# Patient Record
Sex: MALE | Race: WHITE | NOT HISPANIC OR LATINO | Employment: FULL TIME | URBAN - METROPOLITAN AREA
[De-identification: names, ages, dates, MRNs, and addresses within clinical notes are randomized per-mention and may not be internally consistent; named-entity substitution may affect disease eponyms.]

---

## 2019-10-07 ENCOUNTER — OFFICE VISIT (OUTPATIENT)
Dept: URGENT CARE | Facility: URGENT CARE | Age: 54
End: 2019-10-07
Payer: COMMERCIAL

## 2019-10-07 VITALS
BODY MASS INDEX: 25.9 KG/M2 | DIASTOLIC BLOOD PRESSURE: 88 MMHG | TEMPERATURE: 98.2 F | HEART RATE: 109 BPM | SYSTOLIC BLOOD PRESSURE: 186 MMHG | RESPIRATION RATE: 18 BRPM | OXYGEN SATURATION: 98 % | WEIGHT: 191 LBS

## 2019-10-07 DIAGNOSIS — B02.9 HERPES ZOSTER WITHOUT COMPLICATION: Primary | ICD-10-CM

## 2019-10-07 PROCEDURE — 99203 OFFICE O/P NEW LOW 30 MIN: CPT

## 2019-10-07 RX ORDER — VALACYCLOVIR HYDROCHLORIDE 1 G/1
1000 TABLET, FILM COATED ORAL 3 TIMES DAILY
Qty: 21 TABLET | Refills: 0 | Status: SHIPPED | OUTPATIENT
Start: 2019-10-07

## 2019-10-07 ASSESSMENT — ENCOUNTER SYMPTOMS
ACTIVITY CHANGE: 0
FEVER: 0
FATIGUE: 0
APPETITE CHANGE: 0

## 2019-10-07 NOTE — PROGRESS NOTES
SUBJECTIVE:   Jv Mcduffie is a 53 year old male presenting with a chief complaint of   Chief Complaint   Patient presents with     Urgent Care     Derm Problem     Pt states that he notices a burn on his left side of head but had no trauma or injury that he can recall, states he  noticed it since last Wednesday now started blistering        He is a new patient of [facility].    Rash    Onset of rash was 1 week ago.   Course of illness is sudden onset and worsening.  Severity moderate  Current and Associated symptoms: itching, burning, painful, red and blistering   Location of the rash: Occiput to posterior head  Previous history of a similar rash? No  Recent exposure history: none known  Denies exposure to: none known  Associated symptoms include: nothing.  Treatment measures tried include: aloe vera, NSAID and tylenol with some relief    Review of Systems   Constitutional: Negative for activity change, appetite change, fatigue and fever.   Skin: Positive for rash.   All other systems reviewed and are negative.      History reviewed. No pertinent past medical history.  History reviewed. No pertinent family history.  Current Outpatient Medications   Medication Sig Dispense Refill     IBUPROFEN 200 MG PO CAPS 2 CAPSULE EVERY 4 TO 6 HOURS AS NEEDED       TYLENOL 325 MG PO TABS 2 TABLETS EVERY 4 HOURS AS NEEDED       valACYclovir (VALTREX) 1000 mg tablet Take 1 tablet (1,000 mg) by mouth 3 times daily 21 tablet 0     zolpidem (AMBIEN) 10 MG tablet Take 1 tablet by mouth. Take 1/2 hour prior to lights out (Patient not taking: Reported on 10/7/2019) 1 tablet 0     Social History     Tobacco Use     Smoking status: Never Smoker     Smokeless tobacco: Never Used   Substance Use Topics     Alcohol use: Yes     Comment: occ       OBJECTIVE  BP (!) 186/88   Pulse 109   Temp 98.2  F (36.8  C) (Oral)   Resp 18   Wt 86.6 kg (191 lb)   SpO2 98%   BMI 25.90 kg/m      Physical Exam  Constitutional:       Appearance: Normal  appearance. He is not ill-appearing.   Neck:      Musculoskeletal: Neck supple.   Cardiovascular:      Rate and Rhythm: Normal rate and regular rhythm.      Pulses: Normal pulses.      Heart sounds: Normal heart sounds. No murmur. No friction rub. No gallop.    Pulmonary:      Effort: Pulmonary effort is normal.      Breath sounds: Normal breath sounds. No wheezing.   Lymphadenopathy:      Cervical: No cervical adenopathy.   Skin:     General: Skin is warm.      Capillary Refill: Capillary refill takes less than 2 seconds.      Findings: Rash present.      Comments: Erythemic vesicular rash throughout occiput of scalp through nape of neck along dermatome C2 into C3, not involving any facial structures   Neurological:      General: No focal deficit present.      Mental Status: He is alert and oriented to person, place, and time. Mental status is at baseline.   Psychiatric:         Behavior: Behavior normal.         Labs:  No results found for this or any previous visit (from the past 24 hour(s)).      ASSESSMENT:    ICD-10-CM    1. Herpes zoster without complication B02.9 valACYclovir (VALTREX) 1000 mg tablet        Medical Decision Making:    Differential Diagnosis:  Rash: Zoster    Serious Comorbid Conditions:  Adult:  None    PLAN: Discussed with patient the treatment options and nature of herpes zoster. Advised lesions should be compressed/soaked with saline, topical antibiotic ointment to any infected lesions and monitor closely.   Aloe Vera cream may help minor local pain. Discussed use of antiviral agents within 2-3 days of symptoms.     Prescription for valacyclovir (Valtrex) provided, and advised how it may shorten the course of acute symptoms and reduce the incidence of later neuralgia.    Briefly discussed postherpetic neuralgia is explained; this may occur especially in the elderly despite every attempt at prevention. Patient advised to follow up with a PCP in 1-2 weeks. Patient agreed to the plan of care  with no further questions or concerns.     DONIS Nunez, CNP    Patient Instructions     Patient Education     Shingles  Shingles is a viral infection caused by the same virus that causes chicken pox. Anyone who has had chicken pox may get shingles later in life. The virus stays in the body, but remains asleep (dormant). Shingles often occurs in older persons or persons with lowered immunity. But it can affect anyone at any age.  Shingles starts as a tingling patch of skin on one side of the body. Small, painful blisters may then appear. The rash rarely spreads to other parts of the body.  Exposure to shingles can't cause shingles. However, it can cause chicken pox in anyone who has not had chicken pox or has not been vaccinated. The contagious period ends when all blisters have crusted over, generally 1 to 2 weeks after the illness starts.  After the blisters heal, the affected skin may be sensitive or painful for weeks or months, gradually resolving over time. But, sometimes this can last longer and be permanent (called postherpetic neuralgia.)  Shingles vaccines are available. Vaccination can help prevent shingles or make it less painful. It is generally recommended for adults older than 50, even if you've had singles in the past. Talk with your healthcare provider about when to get vaccinated and which vaccine is best for you.  Home care    Medicines may be prescribed to help relieve pain. Take these medicines as directed. Ask your healthcare provider or pharmacist before using over-the-counter medicines for helping treat pain and itching.    In certain cases, antiviral medicines may be prescribed to reduce pain, shorten the illness, and prevent neuralgia. Take these medicines as directed.    Compresses made from a solution of cool water mixed with cornstarch or baking soda may help relieve pain and itching.     Gently wash skin daily with soap and water to help prevent infection. Be certain to rinse  off all of the soap, which can be irritating.    Trim fingernails and try not to scratch. Scratching the sores may leave scars.    Stay home from work or school until all blisters have formed a crust and you are no longer contagious.  Follow-up care  Follow up with your healthcare provider, or as directed.  When to seek medical advice    Fever of 100.4 F (38 C) or higher, or as directed by your healthcare provider    Affected skin is on the face or neck and any of the following occur:  ? Headache  ? Eye pain  ? Changes in vision  ? Sores near the eye  ? Weakness of facial muscles    Blisters occurring on new areas of the body    Pain, redness, or swelling of a joint    Signs of skin infection: colored drainage from the sores, warmth, increasing redness, fever, or increasing pain  Date Last Reviewed: 4/1/2018 2000-2018 [employer]. [address] [place] [address], [place] [id number]. All rights reserved. This information is not intended as a substitute for professional medical care. Always follow your healthcare professional's instructions.

## 2019-10-07 NOTE — PATIENT INSTRUCTIONS
Patient Education     Shingles  Shingles is a viral infection caused by the same virus that causes chicken pox. Anyone who has had chicken pox may get shingles later in life. The virus stays in the body, but remains asleep (dormant). Shingles often occurs in older persons or persons with lowered immunity. But it can affect anyone at any age.  Shingles starts as a tingling patch of skin on one side of the body. Small, painful blisters may then appear. The rash rarely spreads to other parts of the body.  Exposure to shingles can't cause shingles. However, it can cause chicken pox in anyone who has not had chicken pox or has not been vaccinated. The contagious period ends when all blisters have crusted over, generally 1 to 2 weeks after the illness starts.  After the blisters heal, the affected skin may be sensitive or painful for weeks or months, gradually resolving over time. But, sometimes this can last longer and be permanent (called postherpetic neuralgia.)  Shingles vaccines are available. Vaccination can help prevent shingles or make it less painful. It is generally recommended for adults older than 50, even if you've had singles in the past. Talk with your healthcare provider about when to get vaccinated and which vaccine is best for you.  Home care    Medicines may be prescribed to help relieve pain. Take these medicines as directed. Ask your healthcare provider or pharmacist before using over-the-counter medicines for helping treat pain and itching.    In certain cases, antiviral medicines may be prescribed to reduce pain, shorten the illness, and prevent neuralgia. Take these medicines as directed.    Compresses made from a solution of cool water mixed with cornstarch or baking soda may help relieve pain and itching.     Gently wash skin daily with soap and water to help prevent infection. Be certain to rinse off all of the soap, which can be irritating.    Trim fingernails and try not to scratch.  Scratching the sores may leave scars.    Stay home from work or school until all blisters have formed a crust and you are no longer contagious.  Follow-up care  Follow up with your healthcare provider, or as directed.  When to seek medical advice    Fever of 100.4 F (38 C) or higher, or as directed by your healthcare provider    Affected skin is on the face or neck and any of the following occur:  ? Headache  ? Eye pain  ? Changes in vision  ? Sores near the eye  ? Weakness of facial muscles    Blisters occurring on new areas of the body    Pain, redness, or swelling of a joint    Signs of skin infection: colored drainage from the sores, warmth, increasing redness, fever, or increasing pain  Date Last Reviewed: 4/1/2018 2000-2018 [employer]. [address] [place] [address], [place] [address]. All rights reserved. This information is not intended as a substitute for professional medical care. Always follow your healthcare professional's instructions.

## 2019-10-22 ENCOUNTER — OFFICE VISIT (OUTPATIENT)
Dept: FAMILY MEDICINE | Facility: CLINIC | Age: 54
End: 2019-10-22
Payer: COMMERCIAL

## 2019-10-22 VITALS
OXYGEN SATURATION: 99 % | SYSTOLIC BLOOD PRESSURE: 143 MMHG | WEIGHT: 193.5 LBS | BODY MASS INDEX: 26.21 KG/M2 | HEIGHT: 72 IN | HEART RATE: 97 BPM | TEMPERATURE: 97.8 F | DIASTOLIC BLOOD PRESSURE: 90 MMHG

## 2019-10-22 DIAGNOSIS — Z23 NEED FOR PROPHYLACTIC VACCINATION AND INOCULATION AGAINST INFLUENZA: Primary | ICD-10-CM

## 2019-10-22 PROCEDURE — 99202 OFFICE O/P NEW SF 15 MIN: CPT | Mod: 25 | Performed by: INTERNAL MEDICINE

## 2019-10-22 PROCEDURE — 90471 IMMUNIZATION ADMIN: CPT | Performed by: INTERNAL MEDICINE

## 2019-10-22 PROCEDURE — 90682 RIV4 VACC RECOMBINANT DNA IM: CPT | Performed by: INTERNAL MEDICINE

## 2019-10-22 ASSESSMENT — MIFFLIN-ST. JEOR: SCORE: 1760.71

## 2019-10-22 NOTE — PROGRESS NOTES
Subjective     Jv Mcduffie is a 54 year old male who presents to clinic today for the following health issues:    HPI   Shingles follow up      History reviewed. No pertinent past medical history.    Review of Systems   Respiratory: Negative for shortness of breath.    Cardiovascular: Negative for chest pain, palpitations and leg swelling.   Gastrointestinal: Negative for nausea and vomiting.   Neurological: Negative for weakness, light-headedness, numbness and headaches.       BP (!) 143/90   Pulse 97   Temp 97.8  F (36.6  C) (Oral)   Ht 1.829 m (6')   Wt 87.8 kg (193 lb 8 oz)   SpO2 99%   BMI 26.24 kg/m      Physical Exam  Vitals signs and nursing note reviewed.   Constitutional:       General: He is not in acute distress.  Neck:      Thyroid: No thyromegaly.   Cardiovascular:      Rate and Rhythm: Normal rate and regular rhythm.      Heart sounds: Normal heart sounds.   Pulmonary:      Effort: Pulmonary effort is normal. No respiratory distress.      Breath sounds: Normal breath sounds.   Neurological:      Mental Status: He is alert and oriented to person, place, and time.      Coordination: Coordination normal.           ICD-10-CM    1. Need for prophylactic vaccination and inoculation against influenza Z23 INFLUENZA QUAD, RECOMBINANT, P-FREE (RIV4) (FLUBLOCK) [83212]     Vaccine Administration, Initial [52111]       Patient Instructions   May get the new shingles vaccine (Shingrix) at the pharmacy or at the clinic (if clinic administration is covered by insurance).

## 2019-10-22 NOTE — PATIENT INSTRUCTIONS
May get the new shingles vaccine (Shingrix) at the pharmacy or at the clinic (if clinic administration is covered by insurance).

## 2019-10-28 ASSESSMENT — ENCOUNTER SYMPTOMS
VOMITING: 0
WEAKNESS: 0
NUMBNESS: 0
HEADACHES: 0
PALPITATIONS: 0
NAUSEA: 0
SHORTNESS OF BREATH: 0
LIGHT-HEADEDNESS: 0

## 2020-03-01 ENCOUNTER — HEALTH MAINTENANCE LETTER (OUTPATIENT)
Age: 55
End: 2020-03-01

## 2020-12-14 ENCOUNTER — HEALTH MAINTENANCE LETTER (OUTPATIENT)
Age: 55
End: 2020-12-14

## 2021-04-17 ENCOUNTER — HEALTH MAINTENANCE LETTER (OUTPATIENT)
Age: 56
End: 2021-04-17

## 2021-04-20 ENCOUNTER — IMMUNIZATION (OUTPATIENT)
Dept: PEDIATRICS | Facility: CLINIC | Age: 56
End: 2021-04-20
Payer: COMMERCIAL

## 2021-04-20 PROCEDURE — 91300 PR COVID VAC PFIZER DIL RECON 30 MCG/0.3 ML IM: CPT

## 2021-04-20 PROCEDURE — 0001A PR COVID VAC PFIZER DIL RECON 30 MCG/0.3 ML IM: CPT

## 2021-05-11 ENCOUNTER — IMMUNIZATION (OUTPATIENT)
Dept: PEDIATRICS | Facility: CLINIC | Age: 56
End: 2021-05-11
Attending: INTERNAL MEDICINE
Payer: COMMERCIAL

## 2021-05-11 PROCEDURE — 0002A PR COVID VAC PFIZER DIL RECON 30 MCG/0.3 ML IM: CPT

## 2021-05-11 PROCEDURE — 91300 PR COVID VAC PFIZER DIL RECON 30 MCG/0.3 ML IM: CPT

## 2021-10-02 ENCOUNTER — HEALTH MAINTENANCE LETTER (OUTPATIENT)
Age: 56
End: 2021-10-02

## 2021-11-29 ENCOUNTER — IMMUNIZATION (OUTPATIENT)
Dept: NURSING | Facility: CLINIC | Age: 56
End: 2021-11-29
Payer: COMMERCIAL

## 2021-11-29 PROCEDURE — 91306 COVID-19,PF,MODERNA (18+ YRS BOOSTER .25ML): CPT

## 2021-11-29 PROCEDURE — 0064A COVID-19,PF,MODERNA (18+ YRS BOOSTER .25ML): CPT

## 2022-05-08 ENCOUNTER — HEALTH MAINTENANCE LETTER (OUTPATIENT)
Age: 57
End: 2022-05-08

## 2022-06-03 ENCOUNTER — IMMUNIZATION (OUTPATIENT)
Dept: NURSING | Facility: CLINIC | Age: 57
End: 2022-06-03
Payer: COMMERCIAL

## 2022-06-03 PROCEDURE — 0064A COVID-19,PF,MODERNA (18+ YRS BOOSTER .25ML): CPT

## 2022-06-03 PROCEDURE — 91306 COVID-19,PF,MODERNA (18+ YRS BOOSTER .25ML): CPT

## 2022-12-08 ENCOUNTER — IMMUNIZATION (OUTPATIENT)
Dept: NURSING | Facility: CLINIC | Age: 57
End: 2022-12-08
Payer: COMMERCIAL

## 2022-12-08 PROCEDURE — 91312 COVID-19 VACCINE BIVALENT BOOSTER 12+ (PFIZER): CPT

## 2022-12-08 PROCEDURE — 0124A COVID-19 VACCINE BIVALENT BOOSTER 12+ (PFIZER): CPT

## 2023-01-14 ENCOUNTER — HEALTH MAINTENANCE LETTER (OUTPATIENT)
Age: 58
End: 2023-01-14

## 2023-06-02 ENCOUNTER — HEALTH MAINTENANCE LETTER (OUTPATIENT)
Age: 58
End: 2023-06-02

## 2023-09-16 ENCOUNTER — OFFICE VISIT (OUTPATIENT)
Dept: URGENT CARE | Facility: URGENT CARE | Age: 58
End: 2023-09-16
Payer: COMMERCIAL

## 2023-09-16 VITALS
SYSTOLIC BLOOD PRESSURE: 151 MMHG | BODY MASS INDEX: 25.9 KG/M2 | OXYGEN SATURATION: 97 % | DIASTOLIC BLOOD PRESSURE: 90 MMHG | WEIGHT: 191 LBS | TEMPERATURE: 98.8 F | HEART RATE: 89 BPM

## 2023-09-16 DIAGNOSIS — S05.01XA ABRASION OF RIGHT CORNEA, INITIAL ENCOUNTER: Primary | ICD-10-CM

## 2023-09-16 PROCEDURE — 99203 OFFICE O/P NEW LOW 30 MIN: CPT

## 2023-09-16 RX ORDER — MOXIFLOXACIN 5 MG/ML
1 SOLUTION/ DROPS OPHTHALMIC 4 TIMES DAILY
Qty: 3 ML | Refills: 0 | Status: SHIPPED | OUTPATIENT
Start: 2023-09-16 | End: 2023-09-23

## 2023-09-16 NOTE — PROGRESS NOTES
Assessment & Plan     Abrasion of right cornea, initial encounter    - moxifloxacin (VIGAMOX) 0.5 % ophthalmic solution; Place 1 drop into the right eye 4 times daily for 7 days    Treat with Vigamox.  Cool compress to eye prn comfort.  Close Follow-up with OPHTHALMOLOGY if no change or new or worsening sx prn.    Karime Bliss MD   Urgent Care Provider  [facility] KAILASH Sutton is a 57 year old, presenting for the following health issues:  Urgent Care and Eye Problem (Right eye red, sensitive to light and throbbing since Friday morning.)      HPI     Increasing redness and sensitivity to light- RIGHT eye.  No itching, only clear drainage.  Was helping dryer at home be serviced on W and had multiple particles in the air around him on W.  Sx started on F AM.  Wears glasses- no contacts.  No vision changes.        Review of Systems   Constitutional, HEENT, cardiovascular, pulmonary, GI, , musculoskeletal, neuro, skin, endocrine and psych systems are negative, except as otherwise noted.      Objective    BP (!) 151/90   Pulse 89   Temp 98.8  F (37.1  C) (Tympanic)   Wt 86.6 kg (191 lb)   SpO2 97%   BMI 25.90 kg/m    Body mass index is 25.9 kg/m .  Physical Exam   GENERAL: healthy, alert and no distress  EYES: Eyes grossly normal to inspection, PERRL, and EOMI, RIGHT eye is reddened with clear drainage.  Conjunctivae and sclerae are injected.  LEFT eye is normal.  No vision changes.  PSYCH: mentation appears normal, affect normal/bright    RIGHT eye is fluorescein stained with + corneal abrasion just below iris at six oclock position

## 2023-11-13 ENCOUNTER — HOSPITAL ENCOUNTER (EMERGENCY)
Facility: CLINIC | Age: 58
Discharge: HOME | End: 2023-11-13
Attending: EMERGENCY MEDICINE | Admitting: EMERGENCY MEDICINE
Payer: COMMERCIAL

## 2023-11-13 VITALS
WEIGHT: 185 LBS | RESPIRATION RATE: 20 BRPM | SYSTOLIC BLOOD PRESSURE: 136 MMHG | TEMPERATURE: 100.5 F | OXYGEN SATURATION: 99 % | DIASTOLIC BLOOD PRESSURE: 90 MMHG | BODY MASS INDEX: 25.06 KG/M2 | HEIGHT: 72 IN | HEART RATE: 109 BPM

## 2023-11-13 DIAGNOSIS — L03.319 CELLULITIS AND ABSCESS OF TRUNK: ICD-10-CM

## 2023-11-13 DIAGNOSIS — R73.9 BLOOD GLUCOSE ELEVATED: ICD-10-CM

## 2023-11-13 DIAGNOSIS — L02.219 CELLULITIS AND ABSCESS OF TRUNK: ICD-10-CM

## 2023-11-13 LAB
ANION GAP SERPL CALCULATED.3IONS-SCNC: 16 MMOL/L (ref 7–15)
BASOPHILS # BLD AUTO: 0.1 10E3/UL (ref 0–0.2)
BASOPHILS NFR BLD AUTO: 0 %
BUN SERPL-MCNC: 12.7 MG/DL (ref 6–20)
CALCIUM SERPL-MCNC: 9.6 MG/DL (ref 8.6–10)
CHLORIDE SERPL-SCNC: 94 MMOL/L (ref 98–107)
CREAT SERPL-MCNC: 0.72 MG/DL (ref 0.67–1.17)
DEPRECATED HCO3 PLAS-SCNC: 22 MMOL/L (ref 22–29)
EGFRCR SERPLBLD CKD-EPI 2021: >90 ML/MIN/1.73M2
EOSINOPHIL # BLD AUTO: 0 10E3/UL (ref 0–0.7)
EOSINOPHIL NFR BLD AUTO: 0 %
ERYTHROCYTE [DISTWIDTH] IN BLOOD BY AUTOMATED COUNT: 11.5 % (ref 10–15)
GLUCOSE SERPL-MCNC: 340 MG/DL (ref 70–99)
HCO3 BLDV-SCNC: 23 MMOL/L (ref 21–28)
HCT VFR BLD AUTO: 43.5 % (ref 40–53)
HGB BLD-MCNC: 14.7 G/DL (ref 13.3–17.7)
IMM GRANULOCYTES # BLD: 0.2 10E3/UL
IMM GRANULOCYTES NFR BLD: 1 %
LACTATE BLD-SCNC: 1 MMOL/L
LYMPHOCYTES # BLD AUTO: 1.1 10E3/UL (ref 0.8–5.3)
LYMPHOCYTES NFR BLD AUTO: 6 %
MCH RBC QN AUTO: 30.1 PG (ref 26.5–33)
MCHC RBC AUTO-ENTMCNC: 33.8 G/DL (ref 31.5–36.5)
MCV RBC AUTO: 89 FL (ref 78–100)
MONOCYTES # BLD AUTO: 1.1 10E3/UL (ref 0–1.3)
MONOCYTES NFR BLD AUTO: 6 %
NEUTROPHILS # BLD AUTO: 14.6 10E3/UL (ref 1.6–8.3)
NEUTROPHILS NFR BLD AUTO: 87 %
NRBC # BLD AUTO: 0 10E3/UL
NRBC BLD AUTO-RTO: 0 /100
PCO2 BLDV: 40 MM HG (ref 40–50)
PH BLDV: 7.36 [PH] (ref 7.32–7.43)
PLATELET # BLD AUTO: 304 10E3/UL (ref 150–450)
PO2 BLDV: 29 MM HG (ref 25–47)
POTASSIUM SERPL-SCNC: 4.2 MMOL/L (ref 3.4–5.3)
RBC # BLD AUTO: 4.89 10E6/UL (ref 4.4–5.9)
SAO2 % BLDV: 52 % (ref 94–100)
SODIUM SERPL-SCNC: 132 MMOL/L (ref 135–145)
WBC # BLD AUTO: 17 10E3/UL (ref 4–11)

## 2023-11-13 PROCEDURE — 36415 COLL VENOUS BLD VENIPUNCTURE: CPT | Performed by: EMERGENCY MEDICINE

## 2023-11-13 PROCEDURE — 87040 BLOOD CULTURE FOR BACTERIA: CPT | Mod: 59 | Performed by: EMERGENCY MEDICINE

## 2023-11-13 PROCEDURE — 99284 EMERGENCY DEPT VISIT MOD MDM: CPT | Mod: 25

## 2023-11-13 PROCEDURE — 96361 HYDRATE IV INFUSION ADD-ON: CPT | Mod: XU

## 2023-11-13 PROCEDURE — 250N000011 HC RX IP 250 OP 636: Mod: JZ | Performed by: EMERGENCY MEDICINE

## 2023-11-13 PROCEDURE — 83605 ASSAY OF LACTIC ACID: CPT

## 2023-11-13 PROCEDURE — 96375 TX/PRO/DX INJ NEW DRUG ADDON: CPT

## 2023-11-13 PROCEDURE — 82803 BLOOD GASES ANY COMBINATION: CPT

## 2023-11-13 PROCEDURE — 85025 COMPLETE CBC W/AUTO DIFF WBC: CPT | Performed by: EMERGENCY MEDICINE

## 2023-11-13 PROCEDURE — 258N000003 HC RX IP 258 OP 636: Performed by: EMERGENCY MEDICINE

## 2023-11-13 PROCEDURE — 96365 THER/PROPH/DIAG IV INF INIT: CPT | Mod: XU

## 2023-11-13 PROCEDURE — 80048 BASIC METABOLIC PNL TOTAL CA: CPT | Performed by: EMERGENCY MEDICINE

## 2023-11-13 PROCEDURE — 10060 I&D ABSCESS SIMPLE/SINGLE: CPT

## 2023-11-13 RX ORDER — CEPHALEXIN 500 MG/1
500 CAPSULE ORAL 4 TIMES DAILY
Qty: 28 CAPSULE | Refills: 0 | Status: SHIPPED | OUTPATIENT
Start: 2023-11-13 | End: 2023-11-20

## 2023-11-13 RX ORDER — KETOROLAC TROMETHAMINE 15 MG/ML
15 INJECTION, SOLUTION INTRAMUSCULAR; INTRAVENOUS ONCE
Status: COMPLETED | OUTPATIENT
Start: 2023-11-13 | End: 2023-11-13

## 2023-11-13 RX ORDER — CEFTRIAXONE 2 G/1
2 INJECTION, POWDER, FOR SOLUTION INTRAMUSCULAR; INTRAVENOUS ONCE
Status: COMPLETED | OUTPATIENT
Start: 2023-11-13 | End: 2023-11-13

## 2023-11-13 RX ORDER — SULFAMETHOXAZOLE/TRIMETHOPRIM 800-160 MG
1 TABLET ORAL 2 TIMES DAILY
Qty: 14 TABLET | Refills: 0 | Status: SHIPPED | OUTPATIENT
Start: 2023-11-13 | End: 2023-11-20

## 2023-11-13 RX ADMIN — CEFTRIAXONE SODIUM 2 G: 2 INJECTION, POWDER, FOR SOLUTION INTRAMUSCULAR; INTRAVENOUS at 10:23

## 2023-11-13 RX ADMIN — SODIUM CHLORIDE 1000 ML: 9 INJECTION, SOLUTION INTRAVENOUS at 09:27

## 2023-11-13 RX ADMIN — KETOROLAC TROMETHAMINE 15 MG: 15 INJECTION, SOLUTION INTRAMUSCULAR; INTRAVENOUS at 09:27

## 2023-11-13 ASSESSMENT — ACTIVITIES OF DAILY LIVING (ADL)
ADLS_ACUITY_SCORE: 35
ADLS_ACUITY_SCORE: 35

## 2023-11-13 NOTE — ED TRIAGE NOTES
Patient noticed a large red area to left side of back roughly 1 week ago, tender to touch. Approx hand sized area.      Triage Assessment (Adult)       Row Name 11/13/23 0849 11/13/23 0821       Triage Assessment    Airway WDL WDL WDL       Respiratory WDL    Respiratory WDL WDL WDL       Skin Circulation/Temperature WDL    Skin Circulation/Temperature WDL X  large reddened area to left side of back. for 1 week X  states has sore on back       Cardiac WDL    Cardiac WDL X;rhythm WDL    Cardiac Rhythm ST --       Peripheral/Neurovascular WDL    Peripheral Neurovascular WDL WDL WDL       Cognitive/Neuro/Behavioral WDL    Cognitive/Neuro/Behavioral WDL WDL WDL

## 2023-11-13 NOTE — ED PROVIDER NOTES
History     Chief Complaint:  Cyst and Wound Check     The history is provided by the patient.      Jv Meneses is a 58 year old male with no past pertinent medical history who presents to the emergency department for a cyst and wound check. The patient states that on Tuesday, he noticed a large abscess with surrounding erythema on his left flank. He adds that this abscess is tender to palpation. He notes that this abscess has been causing a sleep disturbance for the past 2 weeks. He has been taking increasing doses of ibuprofen and acetaminophen. He adds that he is also experiencing a fever. Denies nausea, vomiting, diarrhea, or cold symptoms. Denies hx of diabetes.    Independent Historian:   None - Patient Only    Review of External Notes:   None    Medications:    Valacyclovir  Zolpidem    Past Medical History:    No past medical history on file.    Past Surgical History:    No past surgical history on file.     Physical Exam   Patient Vitals for the past 24 hrs:   BP Temp Temp src Pulse Resp SpO2 Height Weight   11/13/23 0912 -- -- -- -- -- 99 % -- --   11/13/23 0911 -- (!) 100.5  F (38.1  C) Oral -- -- -- -- --   11/13/23 0900 (!) 135/90 -- -- 115 -- 98 % -- --   11/13/23 0852 (!) 140/94 -- -- -- -- 99 % -- --   11/13/23 0849 (!) 186/100 -- -- 118 20 99 % 1.829 m (6') 83.9 kg (185 lb)      Physical Exam  Constitutional: Vital signs reviewed as above  General: Alert, pleasant  HEENT: Moist mucous membranes  Eyes: Pupils are equal, round, and reactive to light.   Neck: Normal range of motion  Cardiovascular: normal rate, Regular rhythm and normal heart sounds.  No MRG  Pulmonary/Chest: Effort normal and breath sounds normal. No respiratory distress. Patient has no wheezes. Patient has no rales.   Gastrointestinal: Soft. Positive bowel sounds. No MRG.  Musculoskeletal/Extremities: Full ROM.  Endo: No pitting edema  Neurological: Alert, no focal deficits.  Skin: Skin is warm and dry. Large 15 cm abscess  with surrounding erythema to the left flank, no drainage or discharge, tender to palpation.  Psychiatric: Pleasant    Emergency Department Course     Laboratory:  Labs Ordered and Resulted from Time of ED Arrival to Time of ED Departure   BASIC METABOLIC PANEL - Abnormal       Result Value    Sodium 132 (*)     Potassium 4.2      Chloride 94 (*)     Carbon Dioxide (CO2) 22      Anion Gap 16 (*)     Urea Nitrogen 12.7      Creatinine 0.72      GFR Estimate >90      Calcium 9.6      Glucose 340 (*)    CBC WITH PLATELETS AND DIFFERENTIAL - Abnormal    WBC Count 17.0 (*)     RBC Count 4.89      Hemoglobin 14.7      Hematocrit 43.5      MCV 89      MCH 30.1      MCHC 33.8      RDW 11.5      Platelet Count 304      % Neutrophils 87      % Lymphocytes 6      % Monocytes 6      % Eosinophils 0      % Basophils 0      % Immature Granulocytes 1      NRBCs per 100 WBC 0      Absolute Neutrophils 14.6 (*)     Absolute Lymphocytes 1.1      Absolute Monocytes 1.1      Absolute Eosinophils 0.0      Absolute Basophils 0.1      Absolute Immature Granulocytes 0.2      Absolute NRBCs 0.0     ISTAT GASES LACTATE VENOUS POCT - Abnormal    Lactic Acid POCT 1.0      Bicarbonate Venous POCT 23      O2 Sat, Venous POCT 52 (*)     pCO2 Venous POCT 40      pH Venous POCT 7.36      pO2 Venous POCT 29     BLOOD CULTURE   BLOOD CULTURE      Procedures     Incision and Drainage     Procedure: Incision and Drainage     Consent: Verbal    Indication: Abscess    Location: Left flank    Size: 15 cm    Ultrasound Guidance: No    Preparation: Povidone-iodine     Anesthesia/Sedation: Lidocaine - 1%     Procedure Detail:    Aspiration: No  Incision Type: Single straight  Scalpel: 11  Lesion Management: Probed and deloculated   Wound Management: Left open   Packing: None     Patient Status: The patient tolerated the procedure well: Yes. There were no complications.    Emergency Department Course & Assessments:    Interventions:  Medications   sodium  chloride 0.9% BOLUS 1,000 mL (0 mLs Intravenous Stopped 11/13/23 1023)   ketorolac (TORADOL) injection 15 mg (15 mg Intravenous $Given 11/13/23 0927)   cefTRIAXone (ROCEPHIN) 2 g vial to attach to  ml bag for ADULTS or NS 50 ml bag for PEDS (2 g Intravenous $New Bag 11/13/23 1023)      Assessments:  0911 I obtained history and examined the patient as noted above.   0944 I performed the incision and drainage as noted above in the procedure note.  1047 I discussed findings and discharge with the patient. All questions answered.     Independent Interpretation (X-rays, CTs, rhythm strip):  None    Consultations/Discussion of Management or Tests:  None     Social Determinants of Health affecting care:   None    Disposition:  The patient was discharged to home.     Impression & Plan      Medical Decision Making:  Patient presents with obvious abscess and dry cellulitis to the left flank.  Is been there for at least a couple of weeks.  Patient is not on any medications and is not a known diabetic.  His glucose level here did come back markedly elevated at 340.  He was febrile and tachycardic here as well.  For she is lactate was unremarkable.  He received some fluids and Toradol which brought his fever down and he is feeling much better.  The abscess was incised and drained.  Please see my procedure note as above.  He tolerated this well.  He was given a dose of Rocephin here.  I will start him on Keflex and Bactrim at home.  Reasons to return the emergency were discussed and he was discharged home.    Diagnosis:    ICD-10-CM    1. Cellulitis and abscess of trunk  L03.319     L02.219       2. Blood glucose elevated  R73.9            Discharge Medications:  New Prescriptions    CEPHALEXIN (KEFLEX) 500 MG CAPSULE    Take 1 capsule (500 mg) by mouth 4 times daily for 7 days    SULFAMETHOXAZOLE-TRIMETHOPRIM (BACTRIM DS) 800-160 MG TABLET    Take 1 tablet by mouth 2 times daily for 7 days      Johanna Disclosure:  Zachary OSEI  Carter, am serving as a scribe at 9:44 AM on 11/13/2023 to document services personally performed by Cory Marquez MD based on my observations and the provider's statements to me.     11/13/2023   Cory Marquez MD Walters, Brent Aaron, MD  11/13/23 1055

## 2023-11-18 LAB
BACTERIA BLD CULT: NO GROWTH
BACTERIA BLD CULT: NO GROWTH

## 2024-06-30 ENCOUNTER — HEALTH MAINTENANCE LETTER (OUTPATIENT)
Age: 59
End: 2024-06-30

## 2024-12-12 ENCOUNTER — TRANSFERRED RECORDS (OUTPATIENT)
Dept: MULTI SPECIALTY CLINIC | Facility: CLINIC | Age: 59
End: 2024-12-12

## 2024-12-12 LAB — RETINOPATHY: NORMAL

## 2025-02-07 ENCOUNTER — OFFICE VISIT (OUTPATIENT)
Dept: FAMILY MEDICINE | Facility: CLINIC | Age: 60
End: 2025-02-07
Payer: COMMERCIAL

## 2025-02-07 VITALS
TEMPERATURE: 98.4 F | HEIGHT: 72 IN | OXYGEN SATURATION: 98 % | SYSTOLIC BLOOD PRESSURE: 164 MMHG | HEART RATE: 82 BPM | RESPIRATION RATE: 16 BRPM | DIASTOLIC BLOOD PRESSURE: 100 MMHG | BODY MASS INDEX: 24.92 KG/M2 | WEIGHT: 184 LBS

## 2025-02-07 DIAGNOSIS — Z76.89 ESTABLISHING CARE WITH NEW DOCTOR, ENCOUNTER FOR: Primary | ICD-10-CM

## 2025-02-07 DIAGNOSIS — Z12.5 SCREENING FOR PROSTATE CANCER: ICD-10-CM

## 2025-02-07 DIAGNOSIS — Z83.3 FAMILY HISTORY OF DIABETES MELLITUS: ICD-10-CM

## 2025-02-07 DIAGNOSIS — E78.5 HYPERLIPIDEMIA LDL GOAL <100: ICD-10-CM

## 2025-02-07 DIAGNOSIS — E11.65 TYPE 2 DIABETES MELLITUS WITH HYPERGLYCEMIA, WITHOUT LONG-TERM CURRENT USE OF INSULIN (H): ICD-10-CM

## 2025-02-07 DIAGNOSIS — I10 BENIGN ESSENTIAL HYPERTENSION: ICD-10-CM

## 2025-02-07 DIAGNOSIS — Z12.11 SCREEN FOR COLON CANCER: ICD-10-CM

## 2025-02-07 LAB
CHOLEST SERPL-MCNC: 184 MG/DL
FASTING STATUS PATIENT QL REPORTED: YES
FASTING STATUS PATIENT QL REPORTED: YES
GLUCOSE SERPL-MCNC: 225 MG/DL (ref 70–99)
HDLC SERPL-MCNC: 44 MG/DL
LDLC SERPL CALC-MCNC: 120 MG/DL
NONHDLC SERPL-MCNC: 140 MG/DL
PSA SERPL DL<=0.01 NG/ML-MCNC: 0.78 NG/ML (ref 0–3.5)
TRIGL SERPL-MCNC: 99 MG/DL

## 2025-02-07 PROCEDURE — G2211 COMPLEX E/M VISIT ADD ON: HCPCS | Performed by: INTERNAL MEDICINE

## 2025-02-07 PROCEDURE — 99204 OFFICE O/P NEW MOD 45 MIN: CPT | Performed by: INTERNAL MEDICINE

## 2025-02-07 PROCEDURE — G0103 PSA SCREENING: HCPCS | Performed by: INTERNAL MEDICINE

## 2025-02-07 PROCEDURE — 82947 ASSAY GLUCOSE BLOOD QUANT: CPT | Performed by: INTERNAL MEDICINE

## 2025-02-07 PROCEDURE — 36415 COLL VENOUS BLD VENIPUNCTURE: CPT | Performed by: INTERNAL MEDICINE

## 2025-02-07 PROCEDURE — 80061 LIPID PANEL: CPT | Performed by: INTERNAL MEDICINE

## 2025-02-07 RX ORDER — AMLODIPINE BESYLATE 5 MG/1
5 TABLET ORAL DAILY
Qty: 90 TABLET | Refills: 3 | Status: SHIPPED | OUTPATIENT
Start: 2025-02-07

## 2025-02-07 NOTE — PROGRESS NOTES
Assessment & Plan     Screen for colon cancer  - Colonoscopy Screening  Referral    Benign essential hypertension  - BP is not at goal  - amLODIPine (NORVASC) 5 MG tablet  Dispense: 90 tablet; Refill: 3  - Med Therapy Management Referral    Type 2 diabetes mellitus with hyperglycemia, without long-term current use of insulin (H)  Family history of diabetes mellitus  - symptoms not well controlled  - REVIEW OF HEALTH MAINTENANCE PROTOCOL ORDERS  - Adult Endocrinology  Referral  - Med Therapy Management Referral  - Hemoglobin A1c  - Glucose  - Glucose  - metFORMIN (GLUCOPHAGE XR) 500 MG 24 hr tablet  Dispense: 90 tablet; Refill: 3    Screening for prostate cancer  - PSA, screen  - PSA, screen    Establishing care with new doctor, encounter for  Hyperlipidemia LDL goal <100  - symptoms not well controlled  - Lipid panel reflex to direct LDL Non-fasting  - Lipid panel reflex to direct LDL Non-fasting  - atorvastatin (LIPITOR) 10 MG tablet  Dispense: 90 tablet; Refill: 3                  FUTURE APPOINTMENTS:       - Follow-up visit in 3 months    Cy Sutton is a 59 year old, presenting for the following health issues:  Establish Care and History of Present Illness (DM,Hypertension)    History of Present Illness       Reason for visit:  Check for diabetes, high blood pressure  Symptom onset:  More than a month  Symptoms include:  Retinopathy, Macular Edema, high blood pressure  Symptom intensity:  Severe  Symptom progression:  Worsening  Had these symptoms before:  No   He is taking medications regularly.       Current Outpatient Medications   Medication Sig Dispense Refill    amLODIPine (NORVASC) 5 MG tablet Take 1 tablet (5 mg) by mouth daily. 90 tablet 3     No current facility-administered medications for this visit.     Past Medical History:   Diagnosis Date    HTN (hypertension)        Family History   Problem Relation Age of Onset    Diabetes Mother        Social History     Socioeconomic  History    Marital status: Single     Spouse name: Not on file    Number of children: Not on file    Years of education: Not on file    Highest education level: Not on file   Occupational History    Not on file   Tobacco Use    Smoking status: Never    Smokeless tobacco: Never   Vaping Use    Vaping status: Never Used   Substance and Sexual Activity    Alcohol use: Yes     Comment: occ    Drug use: Never    Sexual activity: Not Currently     Partners: Female   Other Topics Concern    Parent/sibling w/ CABG, MI or angioplasty before 65F 55M? Not Asked   Social History Narrative    Not on file     Social Drivers of Health     Financial Resource Strain: Low Risk  (2/5/2025)    Financial Resource Strain     Within the past 12 months, have you or your family members you live with been unable to get utilities (heat, electricity) when it was really needed?: No   Food Insecurity: Low Risk  (2/5/2025)    Food Insecurity     Within the past 12 months, did you worry that your food would run out before you got money to buy more?: No     Within the past 12 months, did the food you bought just not last and you didn t have money to get more?: No   Transportation Needs: Low Risk  (2/5/2025)    Transportation Needs     Within the past 12 months, has lack of transportation kept you from medical appointments, getting your medicines, non-medical meetings or appointments, work, or from getting things that you need?: No   Physical Activity: Not on file   Stress: Not on file   Social Connections: Not on file   Interpersonal Safety: Low Risk  (2/7/2025)    Interpersonal Safety     Do you feel physically and emotionally safe where you currently live?: Yes     Within the past 12 months, have you been hit, slapped, kicked or otherwise physically hurt by someone?: No     Within the past 12 months, have you been humiliated or emotionally abused in other ways by your partner or ex-partner?: No   Housing Stability: Low Risk  (2/5/2025)    Housing  Stability     Do you have housing? : Yes     Are you worried about losing your housing?: No         Review of Systems  Constitutional, HEENT, cardiovascular, pulmonary, GI, , musculoskeletal, neuro, skin, endocrine and psych systems are negative, except as otherwise noted.      Objective    BP (!) 164/100 (BP Location: Right arm, Patient Position: Sitting, Cuff Size: Adult Regular)   Pulse 82   Temp 98.4  F (36.9  C) (Oral)   Resp 16   Ht 1.829 m (6')   Wt 83.5 kg (184 lb)   SpO2 98%   BMI 24.95 kg/m    Body mass index is 24.95 kg/m .  Physical Exam   GENERAL: alert and no distress  EYES: Eyes grossly normal to inspection, conjunctivae and sclerae normal  HENT: ear canals and TM's normal, nose and mouth without ulcers or lesions  NECK: no asymmetry, masses, or scars  RESP: no cough present  CV: regular rate rhythm  ABDOMEN: nondistended  MS: no gross musculoskeletal defects noted, no edema  SKIN: no suspicious lesions or rashes  NEURO: mentation intact and speech normal  PSYCH: affect normal/bright    Labs reviewed in Epic  Results for orders placed or performed in visit on 02/07/25   Lipid panel reflex to direct LDL Non-fasting     Status: Abnormal   Result Value Ref Range    Cholesterol 184 <200 mg/dL    Triglycerides 99 <150 mg/dL    Direct Measure HDL 44 >=40 mg/dL    LDL Cholesterol Calculated 120 (H) <100 mg/dL    Non HDL Cholesterol 140 (H) <130 mg/dL    Patient Fasting > 8hrs? Yes     Narrative    Cholesterol  Desirable: < 200 mg/dL  Borderline High: 200 - 239 mg/dL  High: >= 240 mg/dL    Triglycerides  Normal: < 150 mg/dL  Borderline High: 150 - 199 mg/dL  High: 200-499 mg/dL  Very High: >= 500 mg/dL    Direct Measure HDL  Female: >= 50 mg/dL   Male: >= 40 mg/dL    LDL Cholesterol  Desirable: < 100 mg/dL  Above Desirable: 100 - 129 mg/dL   Borderline High: 130 - 159 mg/dL   High:  160 - 189 mg/dL   Very High: >= 190 mg/dL    Non HDL Cholesterol  Desirable: < 130 mg/dL  Above Desirable: 130 - 159  mg/dL  Borderline High: 160 - 189 mg/dL  High: 190 - 219 mg/dL  Very High: >= 220 mg/dL   PSA, screen     Status: Normal   Result Value Ref Range    Prostate Specific Antigen Screen 0.78 0.00 - 3.50 ng/mL    Narrative    This result is obtained using the Roche Elecsys total PSA method on the link e801 immunoassay analyzer, which is an ultrasensitive method. Results obtained with different assay methods or kits cannot be used interchangeably.  This test is intended for initial prostate cancer screening. PSA values exceeding the age-specific limits are suspicious for prostate disease, but additional testing, such as prostate biopsy, is needed to diagnose prostate pathology. The American Cancer Society recommends annual examination with digital rectal examination and serum PSA beginning at age 50 and for men with a life expectancy of at least 10 years after detection of prostate cancer. For men in high-risk groups, such as  Americans or men with a first-degree relative diagnosed at a younger age, testing should begin at a younger age. It is generally recommended that information be provided to patients about the benefits and limitations of testing and treatment so they can make informed decisions.   Glucose     Status: Abnormal   Result Value Ref Range    Glucose 225 (H) 70 - 99 mg/dL    Patient Fasting > 8hrs? Yes          The longitudinal plan of care for the diagnosis(es)/condition(s) as documented were addressed during this visit. Due to the added complexity in care, I will continue to support Herman in the subsequent management and with ongoing continuity of care.          Signed Electronically by: Sharmila Zamarripa MD

## 2025-02-08 ENCOUNTER — HEALTH MAINTENANCE LETTER (OUTPATIENT)
Age: 60
End: 2025-02-08

## 2025-02-11 RX ORDER — ATORVASTATIN CALCIUM 10 MG/1
10 TABLET, FILM COATED ORAL DAILY
Qty: 90 TABLET | Refills: 3 | Status: SHIPPED | OUTPATIENT
Start: 2025-02-11

## 2025-02-11 RX ORDER — METFORMIN HYDROCHLORIDE 500 MG/1
500 TABLET, EXTENDED RELEASE ORAL
Qty: 90 TABLET | Refills: 3 | Status: SHIPPED | OUTPATIENT
Start: 2025-02-11

## 2025-02-15 ENCOUNTER — LAB (OUTPATIENT)
Dept: LAB | Facility: CLINIC | Age: 60
End: 2025-02-15
Payer: COMMERCIAL

## 2025-02-15 DIAGNOSIS — I10 BENIGN ESSENTIAL HYPERTENSION: Primary | ICD-10-CM

## 2025-02-15 DIAGNOSIS — Z11.59 NEED FOR HEPATITIS C SCREENING TEST: ICD-10-CM

## 2025-02-15 DIAGNOSIS — E11.65 TYPE 2 DIABETES MELLITUS WITH HYPERGLYCEMIA, WITHOUT LONG-TERM CURRENT USE OF INSULIN (H): ICD-10-CM

## 2025-02-15 DIAGNOSIS — Z11.4 SCREENING FOR HIV (HUMAN IMMUNODEFICIENCY VIRUS): ICD-10-CM

## 2025-02-15 LAB
EST. AVERAGE GLUCOSE BLD GHB EST-MCNC: 235 MG/DL
HBA1C MFR BLD: 9.8 % (ref 0–5.6)

## 2025-02-15 PROCEDURE — 83036 HEMOGLOBIN GLYCOSYLATED A1C: CPT

## 2025-02-15 PROCEDURE — 36415 COLL VENOUS BLD VENIPUNCTURE: CPT

## 2025-02-18 ENCOUNTER — TELEPHONE (OUTPATIENT)
Dept: PHARMACY | Facility: CLINIC | Age: 60
End: 2025-02-18
Payer: COMMERCIAL

## 2025-02-18 NOTE — TELEPHONE ENCOUNTER
Patient left message on [facility] scheduling line [phone] yesterday. Patient would like a sooner appointment with Diane Villarreal PharmD.  I returned call today and left message for patient, I added patient to the wait list to hopefully get a sooner appointment. I left message that I added to wait list and can return call to get in with Quinten Mendez in March but unable to schedule sooner with Diane at this time.

## 2025-02-22 ENCOUNTER — LAB (OUTPATIENT)
Dept: LAB | Facility: CLINIC | Age: 60
End: 2025-02-22
Payer: COMMERCIAL

## 2025-02-22 DIAGNOSIS — I10 BENIGN ESSENTIAL HYPERTENSION: ICD-10-CM

## 2025-02-22 DIAGNOSIS — Z11.59 NEED FOR HEPATITIS C SCREENING TEST: ICD-10-CM

## 2025-02-22 DIAGNOSIS — Z11.4 SCREENING FOR HIV (HUMAN IMMUNODEFICIENCY VIRUS): ICD-10-CM

## 2025-02-22 LAB
ANION GAP SERPL CALCULATED.3IONS-SCNC: 11 MMOL/L (ref 7–15)
BUN SERPL-MCNC: 18.5 MG/DL (ref 6–20)
CALCIUM SERPL-MCNC: 9.5 MG/DL (ref 8.8–10.4)
CHLORIDE SERPL-SCNC: 104 MMOL/L (ref 98–107)
CREAT SERPL-MCNC: 0.84 MG/DL (ref 0.67–1.17)
CREAT UR-MCNC: 61.2 MG/DL
EGFRCR SERPLBLD CKD-EPI 2021: >90 ML/MIN/1.73M2
GLUCOSE SERPL-MCNC: 149 MG/DL (ref 70–99)
HCO3 SERPL-SCNC: 25 MMOL/L (ref 22–29)
HCV AB SERPL QL IA: NONREACTIVE
HIV 1+2 AB+HIV1 P24 AG SERPL QL IA: NONREACTIVE
MICROALBUMIN UR-MCNC: <12 MG/L
MICROALBUMIN/CREAT UR: NORMAL MG/G{CREAT}
POTASSIUM SERPL-SCNC: 4.1 MMOL/L (ref 3.4–5.3)
SODIUM SERPL-SCNC: 140 MMOL/L (ref 135–145)

## 2025-02-22 PROCEDURE — 86803 HEPATITIS C AB TEST: CPT

## 2025-02-22 PROCEDURE — 87389 HIV-1 AG W/HIV-1&-2 AB AG IA: CPT

## 2025-02-22 PROCEDURE — 80048 BASIC METABOLIC PNL TOTAL CA: CPT

## 2025-02-22 PROCEDURE — 36415 COLL VENOUS BLD VENIPUNCTURE: CPT

## 2025-03-19 ENCOUNTER — OFFICE VISIT (OUTPATIENT)
Dept: PHARMACY | Facility: CLINIC | Age: 60
End: 2025-03-19

## 2025-03-19 VITALS — WEIGHT: 179.6 LBS | SYSTOLIC BLOOD PRESSURE: 138 MMHG | BODY MASS INDEX: 24.36 KG/M2 | DIASTOLIC BLOOD PRESSURE: 66 MMHG

## 2025-03-19 DIAGNOSIS — E78.5 HYPERLIPIDEMIA LDL GOAL <70: ICD-10-CM

## 2025-03-19 DIAGNOSIS — I10 HYPERTENSION, UNSPECIFIED TYPE: ICD-10-CM

## 2025-03-19 DIAGNOSIS — E11.65 TYPE 2 DIABETES MELLITUS WITH HYPERGLYCEMIA, WITHOUT LONG-TERM CURRENT USE OF INSULIN (H): Primary | ICD-10-CM

## 2025-03-19 DIAGNOSIS — Z78.9 TAKES DIETARY SUPPLEMENTS: ICD-10-CM

## 2025-03-19 PROCEDURE — 3075F SYST BP GE 130 - 139MM HG: CPT

## 2025-03-19 PROCEDURE — 3078F DIAST BP <80 MM HG: CPT

## 2025-03-19 PROCEDURE — 99605 MTMS BY PHARM NP 15 MIN: CPT

## 2025-03-19 PROCEDURE — 99607 MTMS BY PHARM ADDL 15 MIN: CPT

## 2025-03-19 RX ORDER — ASPIRIN 81 MG/1
81 TABLET ORAL DAILY
COMMUNITY

## 2025-03-19 RX ORDER — MULTIPLE VITAMINS W/ MINERALS TAB 9MG-400MCG
1 TAB ORAL DAILY
COMMUNITY

## 2025-03-19 NOTE — PATIENT INSTRUCTIONS
"Recommendations from today's MTM visit:                                                    MTM (medication therapy management) is a service provided by a clinical pharmacist designed to help you get the most of out of your medicines.   Today we reviewed what your medicines are for, how to know if they are working, that your medicines are safe and how to make your medicine regimen as easy as possible.      I will reach out to Dr. Zamarripa about a diabetes education referral to discuss diet recommendations  Continue all of your medications how you're taking them  Start taking aspirin 81 mg daily form over the counter  I will check with Dr. Zamarripa about sending in a prescription for a Freestyle Evita 3+ continuous glucose monitor to see if your insurance will cover it  When you do start your atorvastatin, we will check your cholesterol about 6-8 weeks later  Consider getting your pneumonia vaccine (PCV20) and Shingrix from the pharmacy  For your A1c, this can be checked every 3 months and our goal is to get it under 7  For your cholesterol, our goal is to get the LDL under 70  For your blood pressure, we would like to see numbers under 130/80 - start checking at home a few times per week  If you get a continuous glucose monitor, we will want your time in range (between ) over 70%    Follow-up: Khadijah when we hear about the glucose monitor, office visit on 4/21 at 2:30PM    It was great speaking with you today.  I value your experience and would be very thankful for your time in providing feedback in our clinic survey. In the next few days, you may receive an email or text message from [employer] with a link to a survey related to your  clinical pharmacist.\"     To schedule another MTM appointment, please call the clinic directly or you may call the MTM scheduling line at [phone] or toll-free at [phone].     My Clinical Pharmacist's contact information:                                                  "     Please feel free to contact me with any questions or concerns you have.      Vanessa Ford, PharmD  Medication Therapy Management Pharmacy Resident  [facility]  [phone]    Diane Villarreal PharmD, [facility]  Medication Therapy Management Provider  [facility]  [phone]

## 2025-03-19 NOTE — PROGRESS NOTES
Medication Therapy Management (MTM) Encounter    ASSESSMENT:                            Medication Adherence/Access: No issues identified.    Diabetes   A1c not at goal <7% before starting metformin, UACR at goal <30 mg/g. Would benefit from being able to monitor blood sugars at home, CGM would be preferred if covered by insurance.   Would benefit from referral to diabetes education for more advice on diet recommendations.   Metformin is typically recommended to be increased to maximum dose as tolerated, but would be reasonable to wait until he is able to see some home glucose readings before changing.  Since patient is >50 years old, daily low-dose aspirin would be recommended.  Due for multiple vaccines, declines all except pneumonia and Shingles.    Hypertension   BP at goal <140/90 mmHg today, but not more stringent goal of <130/80 mmHg. Would recommend patient get a blood pressure cuff and start monitoring at home to get a full assessment of his blood pressure.     Hyperlipidemia   LDL not at goal <70 mg/dL. Reasonable for patient to wait until his personal life is more settled to add another medication that his family has a history of side effects with. Would recommend when patient does start to recheck cholesterol 6-8 weeks later and watching closely for side effects.     Supplements   Stable.    PLAN:                            Okay per Dr. Zamarripa to place a diabetes education referral to discuss diet recommendations  Continue all of your medications how you're taking them  Start taking aspirin 81 mg daily from over the counter  Okay per Dr. Zamarripa to send in a prescription for a Freestyle Evita 3+ continuous glucose monitor to see if your insurance will cover it  When you do start your atorvastatin, we will check your cholesterol about 6-8 weeks later  Consider getting your pneumonia vaccine (PCV20) and Shingrix from the pharmacy  For your A1c, this can be checked every 3 months and our goal is to get it under  7  For your cholesterol, our goal is to get the LDL under 70  For your blood pressure, we would like to see numbers under 130/80 - start checking at home a few times per week  If you get a continuous glucose monitor, we will want your time in range (between ) over 70%    Follow-up: Khadijah when we hear about the glucose monitor, office visit on 4/21 at 2:30PM    SUBJECTIVE/OBJECTIVE:                          Herman Meneses is a 59 year old male coming in for an initial visit. He was referred to me from Dr. Zamarripa.      Reason for visit: Comprehensive medication review, new diabetes diagnosis.    Allergies/ADRs: Reviewed in chart  Past Medical History: Reviewed in chart  Tobacco: He reports that he has never smoked. He has never used smokeless tobacco.  Alcohol: 5 glasses of wine per year  Caffeine: tea with Chinese food    Medication Adherence/Access: no issues reported.    Diabetes   Metformin 500 mg daily - has been on for about 1 month  Not taking a baby aspirin - was never discussed with him  Patient is not experiencing side effects.  Current diabetes symptoms: none  Diet/Exercise: golfs in the summer, very sedentary during other times of the year. Has lots of questions about how to change his diet and eating habits to align with his new diagnosis.  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     Blood sugar monitoring: none right now, is interested in a CGM  Would be okay using a SMBG monitor after tax season is done if CGM is not covered.  Eye exam in the last 12 months? Yes- Date of last eye exam: 12/12/24,  Location: [facility]  Foot exam: due    Hypertension   Amlodipine 5 mg daily - started on 2/9  Patient reports no current medication side effects  Patient does not self-monitor blood pressure.  Thinks that his blood pressure may be higher when he is at the doctor's office due to being nervous.     Hyperlipidemia   Atorvastatin 10 mg daily - has not started this  yet  His dad took a statin in the past, reports that he did not react well to this and had to be given another medication instead. Prefers to wait until after tax season to start this in case he also has negative side effects.     Supplements   Multivitamin daily  No reported issues at this time.        Today's Vitals: /66   Wt 179 lb 9.6 oz (81.5 kg)   BMI 24.36 kg/m    ----------------    I spent 60 minutes with this patient today. I offer these suggestions for consideration by Sharmila Zamarripa MD. A copy of the visit note was provided to the patient's provider(s).    A summary of these recommendations was given to the patient.    Vanessa Ford, [place]  Medication Therapy Management Pharmacy Resident  [facility] and [facility]    The patient was seen independently by Dr. Ford.  I have read the note and agree with the assessment and plan.  Diane Villarreal PharmD, [facility]  Medication Therapy Management Provider  [phone]         Medication Therapy Recommendations  Type 2 diabetes mellitus with hyperglycemia, without long-term current use of insulin (H)   1 Current Medication: metFORMIN (GLUCOPHAGE XR) 500 MG 24 hr tablet   Current Medication Sig: Take 1 tablet (500 mg) by mouth daily (with dinner).   Rationale: Medication requires monitoring - Needs additional monitoring   Recommendation: Self-Monitoring - FreeStyle Evita 3 Plus Sensor Misc   Status: Accepted per Provider   Identified Date: 3/19/2025 Completed Date: 3/20/2025         2 Rationale: Preventive therapy - Needs additional medication therapy - Indication   Recommendation: Start Medication - aspirin 81 MG Tbec   Status: Accepted - no CPA Needed   Identified Date: 3/19/2025 Completed Date: 3/19/2025         3 Rationale: Preventive therapy - Needs additional medication therapy - Indication   Recommendation: Order Vaccine   Status: Patient Agreed - Adherence/Education   Identified Date: 3/19/2025 Completed Date: 3/19/2025   Note: Shingles, PCV20.  Denied flu, COVID, HepB, and tetanus

## 2025-03-20 RX ORDER — KETOROLAC TROMETHAMINE 30 MG/ML
1 INJECTION, SOLUTION INTRAMUSCULAR; INTRAVENOUS ONCE
Qty: 1 EACH | Refills: 0 | Status: SHIPPED | OUTPATIENT
Start: 2025-03-20 | End: 2025-03-20

## 2025-03-20 RX ORDER — HYDROCHLOROTHIAZIDE 12.5 MG/1
CAPSULE ORAL
Qty: 6 EACH | Refills: 3 | Status: SHIPPED | OUTPATIENT
Start: 2025-03-20

## 2025-03-20 NOTE — PROGRESS NOTES
Sharmila Zamarripa MD [place], Amy [facility]  Agree with plan    Thanks          Previous Messages       ----- Message -----  From: Vanessa Ford RPH  Sent: 3/20/2025   3:16 PM CDT  To: Sharmila Zamarripa MD  Subject: Diabetes Recommendations                        Hi Dr. Zamarripa,    I met with Herman earlier this week for an MTM visit and had a couple thoughts that I wanted to run by you. He had a lot of questions about what to eat and a good diet for his new diabetes diagnosis and I thought he could benefit from meeting with a diabetes educator so I would be happy to place that referral with your okay. He also requested to start a continuous glucose monitor which I am not sure we could get insurance coverage for, but I would be happy to place an order for the Freestyle Evita 3+ with your okay to see what his insurance says.    Thanks,  Vanessa Ford, PharmD  Medication Therapy Management Pharmacy Resident  [facility] and [facility]

## 2025-04-21 ENCOUNTER — OFFICE VISIT (OUTPATIENT)
Dept: PHARMACY | Facility: CLINIC | Age: 60
End: 2025-04-21
Attending: INTERNAL MEDICINE
Payer: COMMERCIAL

## 2025-04-21 VITALS — WEIGHT: 178 LBS | SYSTOLIC BLOOD PRESSURE: 154 MMHG | DIASTOLIC BLOOD PRESSURE: 82 MMHG | BODY MASS INDEX: 24.14 KG/M2

## 2025-04-21 DIAGNOSIS — E11.65 TYPE 2 DIABETES MELLITUS WITH HYPERGLYCEMIA, WITHOUT LONG-TERM CURRENT USE OF INSULIN (H): Primary | ICD-10-CM

## 2025-04-21 DIAGNOSIS — I10 HYPERTENSION, UNSPECIFIED TYPE: ICD-10-CM

## 2025-04-21 DIAGNOSIS — E78.5 HYPERLIPIDEMIA LDL GOAL <70: ICD-10-CM

## 2025-04-21 DIAGNOSIS — Z78.9 TAKES DIETARY SUPPLEMENTS: ICD-10-CM

## 2025-04-21 PROCEDURE — 3079F DIAST BP 80-89 MM HG: CPT | Performed by: PHARMACIST

## 2025-04-21 PROCEDURE — 99207 PR NO CHARGE LOS: CPT | Performed by: PHARMACIST

## 2025-04-21 PROCEDURE — 3077F SYST BP >= 140 MM HG: CPT | Performed by: PHARMACIST

## 2025-04-21 RX ORDER — METFORMIN HYDROCHLORIDE 500 MG/1
1000 TABLET, EXTENDED RELEASE ORAL
Qty: 180 TABLET | Refills: 3 | Status: SHIPPED | OUTPATIENT
Start: 2025-04-21

## 2025-04-21 NOTE — PROGRESS NOTES
Medication Therapy Management (MTM) Encounter    ASSESSMENT:                            Medication Adherence/Access: No issues identified.    Diabetes   Patient is not meeting A1c goal of < 7%.  Blood sugar is not currently being monitored.   May benefit from increasing metformin and adding aspirin.  It sounds like continuous glucose monitor may be covered - he'd like to check with DME suppliers before Rx is re-sent anywhere.  Due to re-check A1c next month.  He can schedule with diabetes education and/or endocrinology if desired.    Hypertension   Patient is not meeting blood pressure goal of < 130/80mmHg.  Agree that home blood pressure monitoring may be helpful, particularly since he's feeling nervous in clinic.    Hyperlipidemia   Plan in place to start statin therapy.      Supplements  Stable.     PLAN:                            This week - increase metformin XR to take 2 tablets per day.  If tolerated, then start atorvastatin daily  If tolerated, then add aspirin 81mg daily.  Plan to re-check A1c on Thursday, May 15th at 2pm.  Call the DME suppliers and let us know which one you'd like us to send the Evita 3+ prescription to.  I do think it'd be good to monitor your blood pressure at home - either with the home blood pressure monitor from work or just buying one over the counter.  If you'd like to move forward with scheduling diabetes education - you can call [phone].  If you'd like to schedule with endocrinology - you can call [phone].    Follow-up: Return in about 24 days (around 5/15/2025) for Lab Work.    SUBJECTIVE/OBJECTIVE:                          Herman Meneses is a 59 year old male seen for a follow-up visit.       Reason for visit: Diabetes follow-up.    Tobacco: He reports that he has never smoked. He has never used smokeless tobacco.  Alcohol: Less than 1 beverage / month    Medication Adherence/Access: no issues reported.    Diabetes   Metformin  mg daily - started  "2/15  Aspirin 81mg daily - recommended at last visit but hasn't started yet, was concerned about potential for GI upset while busy with tax season  Patient is not experiencing side effects.  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  He generally east one main meal per day, has been keeping food log for the last few days.  Currently eating 1-2 snacks per day, tends to snack more outside of the tax season.  Finds eating one main meal per day help him manage GI symptoms (needing to have a bowel movement after eating)  Plans to start golfing soon.   Blood sugar monitoring: never - continuous glucose monitor not covered by insurance.  He did call his insurance and was told it would be covered under his medical benefits (through preferred DME supplier [employer] [phone] and [employer] [phone]).  He also has a voucher for Evita 3+ for $75/month for 2 sensors.  Has access to [employer] blood sugar ($35 with unlimited strips, pays for lancets) and blood pressure monitor ($70) through work.  Insurance prefers [employer] testing supplies.  Eye exam is up to date  Foot exam: due  He wonders if he should schedule with diabetes education and/or endocrinology (has had referrals placed for both)    Hypertension   Amlodipine 5 mg daily - started on 2/9  Patient reports no current medication side effects  Patient does not self-monitor blood pressure.    Notes he's feeling \"revved up\" today and feels blood pressure will be high.     Hyperlipidemia   Atorvastatin 10 mg daily - has not started this yet  His dad took a statin in the past, reports that he did not react well to this and had to be given another medication instead.   Agrees to start now that tax season is over.     Supplements   Multivitamin daily  No reported issues at this time.      Today's Vitals: BP (!) 154/82   Wt 178 lb (80.7 kg)   BMI 24.14 kg/m  "   ----------------    I spent 45 minutes with this patient today. All changes were made via collaborative practice agreement with Dr. Zamarripa.     A summary of these recommendations was given to the patient.    Diane Villarreal, PharmD, [facility]  Medication Therapy Management Provider  [phone]      Medication Therapy Recommendations  Hyperlipidemia LDL goal <70   1 Rationale: Untreated condition - Needs additional medication therapy - Indication   Recommendation: Start Medication   Status: Patient Agreed - Adherence/Education   Identified Date: 4/21/2025 Completed Date: 4/21/2025         Type 2 diabetes mellitus with hyperglycemia, without long-term current use of insulin (H)   1 Current Medication: metFORMIN (GLUCOPHAGE XR) 500 MG 24 hr tablet (Discontinued)   Current Medication Sig: Take 1 tablet (500 mg) by mouth daily (with dinner).   Rationale: Dose too low - Dosage too low - Effectiveness   Recommendation: Increase Dose - metFORMIN 500 MG 24 hr tablet   Status: Accepted per CPA   Identified Date: 4/21/2025 Completed Date: 4/21/2025         2 Rationale: Preventive therapy - Needs additional medication therapy - Indication   Recommendation: Start Medication - ASPIRIN 81 PO   Status: Patient Agreed - Adherence/Education   Identified Date: 4/21/2025 Completed Date: 4/21/2025

## 2025-04-21 NOTE — PATIENT INSTRUCTIONS
"Recommendations from today's MTM visit:                                                    MTM (medication therapy management) is a service provided by a clinical pharmacist designed to help you get the most of out of your medicines.   Today we reviewed what your medicines are for, how to know if they are working, that your medicines are safe and how to make your medicine regimen as easy as possible.      This week - increase metformin XR to take 2 tablets per day.    If tolerated, then start atorvastatin daily    If tolerated, then add aspirin 81mg daily.    Plan to re-check A1c on Thursday, May 15th at 2pm.    Call the DME suppliers and let us know which one you'd like us to send the Evita 3+ prescription to.    I do think it'd be good to monitor your blood pressure at home - either with the home blood pressure monitor from work or just buying one over the counter.    If you'd like to move forward with scheduling diabetes education - you can call [phone].    If you'd like to schedule with endocrinology - you can call [phone].    Follow-up: Return in about 24 days (around 5/15/2025) for Lab Work.  We will touch base via [employer] after those labs    It was great speaking with you today.  I value your experience and would be very thankful for your time in providing feedback in our clinic survey. In the next few days, you may receive an email or text message from [employer] with a link to a survey related to your  clinical pharmacist.\"     To schedule another MTM appointment, please call the clinic directly or you may call the MTM scheduling line at [phone] or toll-free at [phone].     My Clinical Pharmacist's contact information:                                                      Please feel free to contact me with any questions or concerns you have.      Vanessa Ford, PharmD  Medication Therapy Management Pharmacy Resident  [facility]  [phone]    Diane " Phil, PharmD, [facility]  Medication Therapy Management Provider  [facility]  [phone]

## 2025-04-22 ENCOUNTER — MYC MEDICAL ADVICE (OUTPATIENT)
Dept: PHARMACY | Facility: CLINIC | Age: 60
End: 2025-04-22
Payer: COMMERCIAL

## 2025-04-22 DIAGNOSIS — E11.65 TYPE 2 DIABETES MELLITUS WITH HYPERGLYCEMIA, WITHOUT LONG-TERM CURRENT USE OF INSULIN (H): Primary | ICD-10-CM

## 2025-04-23 RX ORDER — HYDROCHLOROTHIAZIDE 12.5 MG/1
CAPSULE ORAL
Qty: 6 EACH | Refills: 1 | Status: SHIPPED | OUTPATIENT
Start: 2025-04-23

## 2025-04-25 ENCOUNTER — TRANSFERRED RECORDS (OUTPATIENT)
Dept: HEALTH INFORMATION MANAGEMENT | Facility: CLINIC | Age: 60
End: 2025-04-25
Payer: COMMERCIAL

## 2025-04-25 LAB — RETINOPATHY: POSITIVE

## 2025-04-28 ENCOUNTER — TELEPHONE (OUTPATIENT)
Dept: GASTROENTEROLOGY | Facility: CLINIC | Age: 60
End: 2025-04-28
Payer: COMMERCIAL

## 2025-04-28 DIAGNOSIS — Z12.11 ENCOUNTER FOR SCREENING COLONOSCOPY: Primary | ICD-10-CM

## 2025-04-28 RX ORDER — BISACODYL 5 MG/1
TABLET, DELAYED RELEASE ORAL
Qty: 4 TABLET | Refills: 0 | Status: SHIPPED | OUTPATIENT
Start: 2025-04-28

## 2025-04-28 NOTE — TELEPHONE ENCOUNTER
Pre visit planning completed.      Procedure details:    Patient scheduled for Colonoscopy on 5/13/25.     Arrival time: 1345. Procedure time 1430    Facility location: [facility]; [facility] [place] [address]. Check in location: 1st Floor [facility].     Sedation type: Conscious sedation     Pre op exam needed? No.    Indication for procedure: screening      Chart review:     Electronic implanted devices? No    Recent diagnosis of diverticulitis within the last 6 weeks? No      Medication review:    Diabetic? Yes. Oral diabetic medications: Metformin (glucophage): HOLD day of procedure.    Anticoagulants? No    Weight loss medication/injectable? No GLP-1 medication per patient's medication list. Nursing to verify with pre-assessment call.    Other medication HOLDING recommendations:  N/A      Prep for procedure:     Bowel prep recommendation: Standard Golytely. Bowel prep sent to    [id number], [place] - [address] AT [facility] [address]    Due to: diabetes    Procedure information and instructions sent via [employer]         Deepa Leonard RN  Endoscopy Procedure Pre Assessment   [phone] option 3

## 2025-04-28 NOTE — TELEPHONE ENCOUNTER
Pre assessment completed for upcoming procedure.   (Please see previous telephone encounter notes for complete details)      Procedure details:    Arrival time and facility location reviewed.    Pre op exam needed? No.    Designated  policy reviewed. Instructed to have someone stay 6 hours post procedure.       Medication review:    Medications reviewed. Please see supporting documentation below. Holding recommendations discussed (if applicable).   Oral diabetic medication(s): Metformin (glucophage): HOLD day of procedure.      Prep for procedure:    Procedure prep instructions reviewed.        Any additional information needed:  N/A      Patient verbalized understanding and had no questions or concerns at this time.      Selena Sorensen RN  Endoscopy Procedure Pre Assessment   [phone] option 3

## 2025-05-13 ENCOUNTER — HOSPITAL ENCOUNTER (OUTPATIENT)
Facility: CLINIC | Age: 60
Discharge: HOME | End: 2025-05-13
Attending: COLON & RECTAL SURGERY | Admitting: COLON & RECTAL SURGERY
Payer: COMMERCIAL

## 2025-05-13 VITALS
RESPIRATION RATE: 21 BRPM | HEART RATE: 60 BPM | OXYGEN SATURATION: 99 % | SYSTOLIC BLOOD PRESSURE: 109 MMHG | DIASTOLIC BLOOD PRESSURE: 52 MMHG

## 2025-05-13 LAB — COLONOSCOPY: NORMAL

## 2025-05-13 PROCEDURE — 99153 MOD SED SAME PHYS/QHP EA: CPT | Performed by: COLON & RECTAL SURGERY

## 2025-05-13 PROCEDURE — G0500 MOD SEDAT ENDO SERVICE >5YRS: HCPCS | Mod: PT | Performed by: COLON & RECTAL SURGERY

## 2025-05-13 PROCEDURE — 88305 TISSUE EXAM BY PATHOLOGIST: CPT | Mod: TC | Performed by: COLON & RECTAL SURGERY

## 2025-05-13 PROCEDURE — 250N000011 HC RX IP 250 OP 636: Performed by: COLON & RECTAL SURGERY

## 2025-05-13 PROCEDURE — 45385 COLONOSCOPY W/LESION REMOVAL: CPT | Mod: PT | Performed by: COLON & RECTAL SURGERY

## 2025-05-13 RX ORDER — FENTANYL CITRATE 50 UG/ML
INJECTION, SOLUTION INTRAMUSCULAR; INTRAVENOUS PRN
Status: DISCONTINUED | OUTPATIENT
Start: 2025-05-13 | End: 2025-05-13 | Stop reason: HOSPADM

## 2025-05-13 RX ORDER — ONDANSETRON 2 MG/ML
4 INJECTION INTRAMUSCULAR; INTRAVENOUS
Status: DISCONTINUED | OUTPATIENT
Start: 2025-05-13 | End: 2025-05-13 | Stop reason: HOSPADM

## 2025-05-13 RX ORDER — LIDOCAINE 40 MG/G
CREAM TOPICAL
Status: DISCONTINUED | OUTPATIENT
Start: 2025-05-13 | End: 2025-05-13 | Stop reason: HOSPADM

## 2025-05-13 ASSESSMENT — ACTIVITIES OF DAILY LIVING (ADL)
ADLS_ACUITY_SCORE: 41

## 2025-05-13 NOTE — H&P
Colon & Rectal Surgery History and Physical  Pre-Endoscopy Procedure Note    History of Present Illness   I have been asked by Dr. Zamarripa to evaluate this 59 year old male for colorectal cancer screening. He currently denies any abdominal pain, weight loss, bleeding per rectum, or recent change in bowel habits.    Past Medical History  Diagnosis Date    Diabetes     HTN (hypertension)        Past Surgical History   History reviewed. No pertinent surgical history.     Medications     Medications Prior to Admission   Medication Sig    amLODIPine (NORVASC) 5 MG tablet Take 1 tablet (5 mg) by mouth daily.    metFORMIN (GLUCOPHAGE XR) 500 MG 24 hr tablet Take 2 tablets (1,000 mg) by mouth daily (with dinner).    multivitamin w/minerals (THERA-VIT-M) tablet Take 1 tablet by mouth daily.    aspirin 81 MG EC tablet Take 81 mg by mouth daily.    atorvastatin (LIPITOR) 10 MG tablet Take 1 tablet (10 mg) by mouth daily.    losartan (COZAAR) 25 MG tablet Take 1 tablet (25 mg) by mouth daily.       Allergies   No Known Allergies     Family History   Family history includes Diabetes in his mother.     Social History   He reports that he has never smoked. He has never used smokeless tobacco. He reports current alcohol use. He reports that he does not use drugs.    Review of Systems   Constitutional:  No fever, weight change or fatigue.    Eyes:     No dry eyes or vision changes.   Ears/Nose/Throat/Neck:  No oral ulcers, sore throat or voice change.    Cardiovascular:   No palpitations, syncope, angina or edema.   Respiratory:    No chest pain, excessive sleepiness, shortness of breath or hemoptysis.    Gastrointestinal:   No abdominal pain, nausea, vomiting, diarrhea or heartburn.    Genitourinary:   No dysuria, hematuria, urinary retention or urinary frequency.   Musculoskeletal:  No joint swelling or arthralgias.    Dermatologic:  No skin rash or other skin changes.   Neurologic:    No focal weakness or numbness. No  neuropathy.   Psychiatric:    No depression, anxiety, suicidal ideation, or paranoid ideation.   Endocrine:   No cold or heat intolerance, polydipsia, hirsutism, change in libido, or flushing.   Hematology/Lymphatic:  No bleeding or lymphadenopathy.    Allergy/Immunology:  No rhinitis or hives.     Physical Exam   Vitals:  Pulse 93, resp. rate 14, SpO2 100%.    General:  Alert and oriented to person, place and time   Airway: Normal oropharyngeal airway and neck mobility   Lungs:  Clear bilaterally   Heart:  Regular rate and rhythm   Abdomen: Soft, NT, ND, no masses   Extremities: Warm, good capillary refill    ASA Grade: II (mild systemic disease)    Impression: Cleared for use of conscious sedation for colorectal cancer screening    Plan: Proceed with colonoscopy     Rachael Shepard MD  [facility]  [phone]

## 2025-05-15 LAB
PATH REPORT.COMMENTS IMP SPEC: NORMAL
PATH REPORT.COMMENTS IMP SPEC: NORMAL
PATH REPORT.FINAL DX SPEC: NORMAL
PATH REPORT.GROSS SPEC: NORMAL
PATH REPORT.MICROSCOPIC SPEC OTHER STN: NORMAL
PATH REPORT.RELEVANT HX SPEC: NORMAL
PHOTO IMAGE: NORMAL

## 2025-05-15 PROCEDURE — 88305 TISSUE EXAM BY PATHOLOGIST: CPT | Mod: 26 | Performed by: PATHOLOGY

## 2025-05-26 ENCOUNTER — RESULTS FOLLOW-UP (OUTPATIENT)
Dept: SURGERY | Facility: CLINIC | Age: 60
End: 2025-05-26

## 2025-05-27 ENCOUNTER — PATIENT OUTREACH (OUTPATIENT)
Dept: GASTROENTEROLOGY | Facility: CLINIC | Age: 60
End: 2025-05-27
Payer: COMMERCIAL

## 2025-05-27 PROBLEM — D12.6 ADENOMATOUS COLON POLYP: Status: ACTIVE | Noted: 2025-05-27

## 2025-06-23 ENCOUNTER — LAB (OUTPATIENT)
Dept: LAB | Facility: CLINIC | Age: 60
End: 2025-06-23
Payer: COMMERCIAL

## 2025-06-23 DIAGNOSIS — E11.65 TYPE 2 DIABETES MELLITUS WITH HYPERGLYCEMIA, WITHOUT LONG-TERM CURRENT USE OF INSULIN (H): ICD-10-CM

## 2025-06-23 LAB
EST. AVERAGE GLUCOSE BLD GHB EST-MCNC: 126 MG/DL
HBA1C MFR BLD: 6 % (ref 0–5.6)

## 2025-06-23 PROCEDURE — 83036 HEMOGLOBIN GLYCOSYLATED A1C: CPT

## 2025-06-23 PROCEDURE — 36415 COLL VENOUS BLD VENIPUNCTURE: CPT

## 2025-06-24 ENCOUNTER — RESULTS FOLLOW-UP (OUTPATIENT)
Dept: PHARMACY | Facility: CLINIC | Age: 60
End: 2025-06-24

## (undated) RX ORDER — FENTANYL CITRATE 50 UG/ML
INJECTION, SOLUTION INTRAMUSCULAR; INTRAVENOUS
Status: DISPENSED
Start: 2025-05-13